# Patient Record
Sex: MALE | Race: WHITE | Employment: OTHER | URBAN - METROPOLITAN AREA
[De-identification: names, ages, dates, MRNs, and addresses within clinical notes are randomized per-mention and may not be internally consistent; named-entity substitution may affect disease eponyms.]

---

## 2018-04-26 ENCOUNTER — HOSPITAL ENCOUNTER (OUTPATIENT)
Dept: WOUND CARE | Age: 78
Discharge: HOME | End: 2018-04-26
Payer: MEDICARE

## 2018-04-26 VITALS
RESPIRATION RATE: 18 BRPM | DIASTOLIC BLOOD PRESSURE: 85 MMHG | WEIGHT: 270 LBS | HEIGHT: 72 IN | SYSTOLIC BLOOD PRESSURE: 133 MMHG | BODY MASS INDEX: 36.57 KG/M2 | TEMPERATURE: 98.6 F | HEART RATE: 90 BPM

## 2018-04-26 PROCEDURE — 99204 OFFICE O/P NEW MOD 45 MIN: CPT

## 2018-04-26 RX ORDER — FLUOXETINE 10 MG/1
10 CAPSULE ORAL DAILY
COMMUNITY

## 2018-04-26 RX ORDER — LISINOPRIL 20 MG/1
20 TABLET ORAL 2 TIMES DAILY
COMMUNITY

## 2018-04-26 RX ORDER — ATORVASTATIN CALCIUM 20 MG/1
20 TABLET, FILM COATED ORAL DAILY
COMMUNITY

## 2018-04-26 RX ORDER — METOLAZONE 5 MG/1
TABLET ORAL AS NEEDED
COMMUNITY

## 2018-04-26 RX ORDER — CARVEDILOL 12.5 MG/1
12.5 TABLET ORAL 2 TIMES DAILY WITH MEALS
COMMUNITY

## 2018-04-26 RX ORDER — FUROSEMIDE 40 MG/1
40 TABLET ORAL DAILY
COMMUNITY

## 2018-04-26 RX ORDER — TAMSULOSIN HYDROCHLORIDE 0.4 MG/1
0.4 CAPSULE ORAL DAILY
COMMUNITY

## 2018-04-26 RX ORDER — MAGNESIUM CHLORIDE 70 MG
64 TABLET, DELAYED RELEASE (ENTERIC COATED) ORAL 2 TIMES DAILY
COMMUNITY

## 2018-04-26 NOTE — PROGRESS NOTES
Wound Center  Initial Consult Note          Chief Complaint:  Mckenzie Wang is a 65 y.o.  male who presents with lower leg swelling/ wounds of few weeks duration. Referred by:      HPI:   H/o leg swelling; unclear how wounds started; heavy drainage  On diurectic  Difficulty in elevating leg  Wound caused by: edema  Current wound care:none  Offloading wound:no  Appetite: good  Wound associated pain: 0  Diabetic: yes  Smoker: no  ROS: no N/V, no T/chills; no local rash, no known CHF, does get SOB  Past Medical History:   Diagnosis Date    Arrhythmia     A-Fib    Diabetes ([facility] [address]     Type 2    Hypertension     Morbid obesity ([facility] [address]       Past Surgical History:   Procedure Laterality Date    ABDOMEN SURGERY PROC UNLISTED      gallbalddar removal    HX APPENDECTOMY      HX CHOLECYSTECTOMY      HX TONSILLECTOMY      HX UROLOGICAL      vasectomy     Family History   Problem Relation Age of Onset    Cancer Brother       Social History   Substance Use Topics    Smoking status: Former Smoker    Smokeless tobacco: Never Used    Alcohol use Yes      Comment: 3 drinks per week       Prior to Admission medications    Medication Sig Start Date End Date Taking? Authorizing Provider   atorvastatin (LIPITOR) 20 mg tablet Take 20 mg by mouth daily. Yes Historical Provider   FLUoxetine (PROZAC) 10 mg capsule Take 10 mg by mouth daily. Yes Historical Provider   furosemide (LASIX) 40 mg tablet Take 40 mg by mouth daily. Yes Historical Provider   SITagliptin (JANUVIA) 100 mg tablet Take 100 mg by mouth daily. Yes Historical Provider   lisinopril (PRINIVIL, ZESTRIL) 20 mg tablet Take 20 mg by mouth two (2) times a day. Yes Historical Provider   magnesium chloride (MAG DELAY) 64 mg delayed release tablet Take 64 mg by mouth two (2) times a day.    Yes Historical Provider   insulin NPH/insulin regular (NOVOLIN 70/30 U-100 INSULIN) 100 unit/mL (70-30) injection 100 Units by SubCUTAneous route Daily (before breakfast). Yes Historical Provider   tamsulosin (FLOMAX) 0.4 mg capsule Take 0.4 mg by mouth daily. Yes Historical Provider   carvedilol (COREG) 12.5 mg tablet Take 12.5 mg by mouth two (2) times daily (with meals). Yes Historical Provider   metOLazone (ZAROXOLYN) 5 mg tablet Take  by mouth as needed (until weight is down 5 lbs then stop). Yes Historical Provider   rivaroxaban (XARELTO) 20 mg tab tablet Take 20 mg by mouth daily. Yes Historical Provider   multivitamin, tx-iron-ca-min (THERA-M W/ IRON) 9 mg iron-400 mcg tab tablet Take 1 Tab by mouth daily. Yes Historical Provider     No Known Allergies     Review of Systems:  A comprehensive review of systems was negative except for that written in the History of Present Illness. Also refer to [facility] notes. HgbA1c:  Advance Care plan:   Pneumonia vaccine:  BMI:see nursing documentation  Counseling re nutrition done. Current meds documented in chart  Pain: 0  Elder maltreatment screen: neg; documented in [facility] notes  Smoking: no  Blood pressure: noted below or in [facility]  Objective:     Physical Exam:     VS see nursing notes  General: well developed, well nourished, pleasant , NAD. Hygiene good, obese  Psych: cooperative. Pleasant. No anxiety or depression. Normal mood and affect. Neuro: alert and oriented to person/place/situation. Otherwise nonfocal.  Derm: Normal  turgor for age, dry skin  HEENT: Normocephalic, atraumatic. EOMI. Conjunctiva clear. No scleral icterus. Neck: Normal range of motion. No masses. Chest: Good air entry bilaterally. Respirations nonlabored  Cardio[de-identified] Normal heart sounds,no rubs, murmurs or gallops  Abdomen: Soft, nontender, nondistended, normoactive bowel sounds  Lower extremities: color normal; temperature normal. Hair growth is not present. Calves are supple, nontender, approximately equally sized in comparison.  Capillary refill <3 sec  Focussed Lower Extremity Exam:  Vascular exam:  Right lower extremity: moderate edema, foot warm,   DP pulse : cannot detect  PT pulse: cannot detect  Nails dystrophic   Left lower extremity: moderate  edema, foot warm,   DP pulse : cannot detect  PT pulse: cannot detect  Nails dystrophic    Ulcer Description:   Etiology: swelling  Location: R lower leg anterior  Measurement: [id number] cm  Ulcer bed: clustered, some slough, partial/full  Thickness mix  Periwound: edema, maceration  Exudate: Moderate amount Serous exudate    Data Review:   No results found for this or any previous visit (from the past 24 hour(s)). Assessment:     65 y.o. male with   1-R lower leg anterior chronic ulcers. 2- leg edema, chronic swelling, R/o venous insuff- order venous reflux    3- Poor vascular exam - order arterial studies    4- DM2 - A1c? - patient to get us records    Needs :  Serial debridement as needed  Good local wound care  Edema management  Nutrition optimization  Good Diabetic control  Plan:   205  scorbion sache  Tubi x 2   order  Supplies      Dressing: scorbion sachet   Frequency: three times a week    Remove dressing prior to showering  Do not get dressing wet    Edema management:   Elevate leg(s) throughout the day starting in the morning  Compression: Tubigrip x 2 layer  Avoid prolonged standing    Nutrition / high BMI:  Cut down startches and carbohydrates, Increase protein and vegetables    Pain management: monitor    Patient understood and agrees with plan. Questions answered. Explained how edema and local wound care works  >37 min spent with patient and spouse (>50% direct counseling)    Weekly visits and serial debridements also discussed.   Follow up with me in 1 week    Signed By: Andrew Rodriguez MD     April 26, 2018

## 2018-04-26 NOTE — WOUND CARE
04/26/18 1326   Wound Leg Lower Right; Anterior   Date First Assessed/Time First Assessed: 04/26/18 1325   POA: Yes  Wound Type: (c) Other  Location: Leg Lower  Orientation: Right; Anterior   Non-Pressure Injury Partial thickness (epider/derm)   Wound Length (cm) 15 cm   Wound Width (cm) 17 cm   Wound Depth (cm) 0.1   Wound Surface area (cm^2) 255 cm^2   Condition of Base Slough   Drainage Amount  Moderate   Drainage Color Serous; Yellow   Periwound Skin Condition Macerated

## 2018-04-27 ENCOUNTER — HOSPITAL ENCOUNTER (OUTPATIENT)
Dept: WOUND CARE | Age: 78
Discharge: HOME | End: 2018-04-27
Payer: MEDICARE

## 2018-05-03 ENCOUNTER — HOSPITAL ENCOUNTER (OUTPATIENT)
Dept: LAB | Age: 78
Discharge: HOME | End: 2018-05-03
Payer: MEDICARE

## 2018-05-03 ENCOUNTER — HOSPITAL ENCOUNTER (OUTPATIENT)
Dept: WOUND CARE | Age: 78
Discharge: HOME | End: 2018-05-03
Payer: MEDICARE

## 2018-05-03 VITALS
SYSTOLIC BLOOD PRESSURE: 122 MMHG | HEART RATE: 56 BPM | TEMPERATURE: 97.8 F | RESPIRATION RATE: 16 BRPM | DIASTOLIC BLOOD PRESSURE: 78 MMHG

## 2018-05-03 PROCEDURE — 97598 DBRDMT OPN WND ADDL 20CM/<: CPT

## 2018-05-03 PROCEDURE — 87070 CULTURE OTHR SPECIMN AEROBIC: CPT

## 2018-05-03 PROCEDURE — 74011000250 HC RX REV CODE- 250: Performed by: FAMILY MEDICINE

## 2018-05-03 PROCEDURE — 87077 CULTURE AEROBIC IDENTIFY: CPT

## 2018-05-03 PROCEDURE — 97597 DBRDMT OPN WND 1ST 20 CM/<: CPT

## 2018-05-03 RX ORDER — LIDOCAINE 40 MG/G
CREAM TOPICAL
Status: COMPLETED | OUTPATIENT
Start: 2018-05-03 | End: 2018-05-03

## 2018-05-03 RX ADMIN — LIDOCAINE: 4 CREAM TOPICAL at 13:01

## 2018-05-03 NOTE — WOUND CARE
05/03/18 1247   Wound Leg Lower Right; Anterior   Date First Assessed/Time First Assessed: 04/26/18 1325   POA: Yes  Wound Type: (c) Other  Location: Leg Lower  Orientation: Right; Anterior   DRESSING STATUS Saturated   DRESSING TYPE Dry dressing   Wound Length (cm) 15 cm   Wound Width (cm) 12 cm   Wound Depth (cm) 0.1   Wound Surface area (cm^2) 180 cm^2   Condition of Base Pink;Slough   Drainage Amount  Large   Drainage Color Yellow;Serous   Wound Odor Musty   Cleansing and Cleansing Agents  Normal saline

## 2018-05-03 NOTE — PROGRESS NOTES
Wound Center  Progress Note / Procedure Note    Subjective:   Dipti Hamilton is a 65 y.o.  male for follow up of R lower leg anterior  venous stasis ulcer since few months duration. Referred by Dr Mai Uribe did not stay on last visit- came for NV the next day. Also scorbion sorbact causing burning    Tolerated tubigrip x 1 layer only    Offloading wound: trying  Appetite: good  Wound associated pain: none  Diabetic: yes  Smoker: no  ROS: no N/V, no T/chills; no local rash  There have been no changes in patient's medical history in the interim. Past Medical History:   Diagnosis Date    Arrhythmia     A-Fib    Diabetes ([facility] [address]     Type 2    Hypertension     Morbid obesity ([facility] [address]       Past Surgical History:   Procedure Laterality Date    ABDOMEN SURGERY PROC UNLISTED      gallbalddar removal    HX APPENDECTOMY      HX CHOLECYSTECTOMY      HX TONSILLECTOMY      HX UROLOGICAL      vasectomy     Family History   Problem Relation Age of Onset    Cancer Brother       Social History   Substance Use Topics    Smoking status: Former Smoker    Smokeless tobacco: Never Used    Alcohol use Yes      Comment: 3 drinks per week       Prior to Admission medications    Medication Sig Start Date End Date Taking? Authorizing Provider   atorvastatin (LIPITOR) 20 mg tablet Take 20 mg by mouth daily. Historical Provider   FLUoxetine (PROZAC) 10 mg capsule Take 10 mg by mouth daily. Historical Provider   furosemide (LASIX) 40 mg tablet Take 40 mg by mouth daily. Historical Provider   SITagliptin (JANUVIA) 100 mg tablet Take 100 mg by mouth daily. Historical Provider   lisinopril (PRINIVIL, ZESTRIL) 20 mg tablet Take 20 mg by mouth two (2) times a day. Historical Provider   magnesium chloride (MAG DELAY) 64 mg delayed release tablet Take 64 mg by mouth two (2) times a day.     Historical Provider   insulin NPH/insulin regular (NOVOLIN 70/30 U-100 INSULIN) 100 unit/mL (70-30) injection 100 Units by SubCUTAneous route Daily (before breakfast). Historical Provider   tamsulosin (FLOMAX) 0.4 mg capsule Take 0.4 mg by mouth daily. Historical Provider   carvedilol (COREG) 12.5 mg tablet Take 12.5 mg by mouth two (2) times daily (with meals). Historical Provider   metOLazone (ZAROXOLYN) 5 mg tablet Take  by mouth as needed (until weight is down 5 lbs then stop). Historical Provider   rivaroxaban (XARELTO) 20 mg tab tablet Take 20 mg by mouth daily. Historical Provider   multivitamin, tx-iron-ca-min (THERA-M W/ IRON) 9 mg iron-400 mcg tab tablet Take 1 Tab by mouth daily. Historical Provider     No Known Allergies     Objective:   Visit Vitals    /78    Pulse (!) 56    Temp 97.8 °F (36.6 °C)    Resp 16      General: well developed, well nourished, pleasant , NAD. Hygiene good, obese  Psych: cooperative. Pleasant. No anxiety or depression. Normal mood and affect. Neuro: alert and oriented to person/place/situation. Otherwise nonfocal.  Lower extremities: color chronic ruddiness; temperature normal. Hair growth is not present. Calves are supple, nontender, approximately equally sized in comparison. Capillary refill <3 sec  Focussed Lower Extremity Exam:  Vascular exam:  Left lower extremity: moderate  edema, foot warm,   Nails dystrophic     Right lower extremity: moderate  edema, foot warm,   DP pulse : 1+  PT pulse: cannot detect   Nails dystrophic    Ulcer Description:   Etiology: Venous / edema  Location: Right leg, mostly anterior, some small areas circumferentially  Measurement: 15 x 12 x 0.1 cm cm  Ulcer bed: partial thickness, mostly slough covered, clustered  Periwound: chronic redness, swelling  Exudate: Moderate amount Serous exudate    Assessment/Plan   65 y.o. male with R lower leg anterior venous stasis ulcer.   1- Ulcer - slough covered, requires debridement, see note below  Chronic wound- with heavy drainage - no previous culture- culture done    Dressing: mepilex transfer, exudry  Frequency: three times a week    2- DM2, A1c?    3- R/O PAD- arterial studies pending    4- Venous reflux pending    5- R/o infection- Culture done      Remove dressing prior to showering  Do not get dressing wet    Edema management:  Elevate leg(s) throughout the day starting in the morning  Compression: Tubigrip x 1 layer  Avoid prolonged standing  Discussed ordering segmental pumps and velcro compression wraps    Nutrition / high BMI:  Cut down startches and carbohydrates, Increase protein and vegetables    Pain management: 0      Patient/spouse understood and agrees with plan. Questions answered. Weekly visits and serial debridements also discussed. Follow up with me in 1 week  -      Ulcer assessment: Due to presence of slough within the wound bed, ulcer requires debridement. Procedure: Debridement:   The indication for debridement was reviewed with patient. Risks of procedure (bleeding, infection, pain) were discussed with patient and consent signed. Questions were answered    Selective debridement   Indication: to remove slough / devitalized tissue/ selectively  Consent in chart   Anesthesia: Topical 2% lidocaine jelly  Instrument: curette  Residual Necrosis: Present and scored   Bleeding: <1ml   Hemostasis: Pressure   Patient tolerated procedure well   Procedural Pain: 0  Post - procedural pain: 0    Post debridement measurements: 15 x 12 x 0.2 cm  Surface area debrided: 15 x 10 = 150 sq.  Cm      Signed By: Gareth Branham MD     May 3, 2018

## 2018-05-09 ENCOUNTER — TELEPHONE (OUTPATIENT)
Dept: WOUND CARE | Age: 78
End: 2018-05-09

## 2018-05-09 NOTE — TELEPHONE ENCOUNTER
Called patient to confirm appointment with Dr. Rhiannon Tillman on 05/10/18, no answer, left voicemail.

## 2018-05-10 ENCOUNTER — HOSPITAL ENCOUNTER (OUTPATIENT)
Dept: WOUND CARE | Age: 78
Discharge: HOME | End: 2018-05-10
Payer: MEDICARE

## 2018-05-10 VITALS
SYSTOLIC BLOOD PRESSURE: 152 MMHG | RESPIRATION RATE: 18 BRPM | DIASTOLIC BLOOD PRESSURE: 90 MMHG | HEART RATE: 76 BPM | TEMPERATURE: 97.8 F

## 2018-05-10 PROCEDURE — 11045 DBRDMT SUBQ TISS EACH ADDL: CPT

## 2018-05-10 PROCEDURE — 11042 DBRDMT SUBQ TIS 1ST 20SQCM/<: CPT

## 2018-05-10 PROCEDURE — 74011000250 HC RX REV CODE- 250: Performed by: FAMILY MEDICINE

## 2018-05-10 RX ORDER — LIDOCAINE 40 MG/G
CREAM TOPICAL
Status: COMPLETED | OUTPATIENT
Start: 2018-05-10 | End: 2018-05-10

## 2018-05-10 RX ADMIN — LIDOCAINE: 4 CREAM TOPICAL at 13:13

## 2018-05-10 NOTE — PROGRESS NOTES
Wound Center  Progress Note / Procedure Note    Subjective:   Cynthia Diamond is a 77 y.o.  male for follow up of R lower leg anterior  venous stasis ulcer since few months duration. Referred by Dr Gissel Chua burning in leg- changing dressing BID  Draining less and less now  Tolerating tubigrip x 1 layer only    Offloading wound: trying  Appetite: good  Wound associated pain: none  Diabetic: yes  Smoker: no  ROS: no N/V, no T/chills; no local rash  There have been no changes in patient's medical history in the interim. Past Medical History:   Diagnosis Date    Arrhythmia     A-Fib    Diabetes ([facility] [address]     Type 2    Hypertension     Morbid obesity ([facility] [address]       Past Surgical History:   Procedure Laterality Date    ABDOMEN SURGERY PROC UNLISTED      gallbalddar removal    HX APPENDECTOMY      HX CHOLECYSTECTOMY      HX TONSILLECTOMY      HX UROLOGICAL      vasectomy     Family History   Problem Relation Age of Onset    Cancer Brother       Social History   Substance Use Topics    Smoking status: Former Smoker    Smokeless tobacco: Never Used    Alcohol use Yes      Comment: 3 drinks per week       Prior to Admission medications    Medication Sig Start Date End Date Taking? Authorizing Provider   atorvastatin (LIPITOR) 20 mg tablet Take 20 mg by mouth daily. Historical Provider   FLUoxetine (PROZAC) 10 mg capsule Take 10 mg by mouth daily. Historical Provider   furosemide (LASIX) 40 mg tablet Take 40 mg by mouth daily. Historical Provider   SITagliptin (JANUVIA) 100 mg tablet Take 100 mg by mouth daily. Historical Provider   lisinopril (PRINIVIL, ZESTRIL) 20 mg tablet Take 20 mg by mouth two (2) times a day. Historical Provider   magnesium chloride (MAG DELAY) 64 mg delayed release tablet Take 64 mg by mouth two (2) times a day.     Historical Provider   insulin NPH/insulin regular (NOVOLIN 70/30 U-100 INSULIN) 100 unit/mL (70-30) injection 100 Units by SubCUTAneous route Daily (before breakfast). Historical Provider   tamsulosin (FLOMAX) 0.4 mg capsule Take 0.4 mg by mouth daily. Historical Provider   carvedilol (COREG) 12.5 mg tablet Take 12.5 mg by mouth two (2) times daily (with meals). Historical Provider   metOLazone (ZAROXOLYN) 5 mg tablet Take  by mouth as needed (until weight is down 5 lbs then stop). Historical Provider   rivaroxaban (XARELTO) 20 mg tab tablet Take 20 mg by mouth daily. Historical Provider   multivitamin, tx-iron-ca-min (THERA-M W/ IRON) 9 mg iron-400 mcg tab tablet Take 1 Tab by mouth daily. Historical Provider     No Known Allergies     Objective:   Visit Vitals    /90    Pulse 76    Temp 97.8 °F (36.6 °C)    Resp 18       General: well developed, well nourished, pleasant , NAD. Hygiene good, obese  Psych: cooperative. Pleasant. No anxiety or depression. Normal mood and affect. Neuro: alert and oriented to person/place/situation. Otherwise nonfocal.  Lower extremities: color chronic ruddiness; temperature normal. Hair growth is not present. Calves are supple, nontender, approximately equally sized in comparison. Capillary refill <3 sec  Focussed Lower Extremity Exam:  Vascular exam:  Left lower extremity: moderate  edema, foot warm,   Nails dystrophic     Right lower extremity: moderate  edema, foot warm,   DP pulse : 1+  PT pulse: cannot detect   Nails dystrophic    Ulcer Description:   Etiology: Venous / edema  Location: Right leg, mostly anterior, some small areas circumferentially  Measurement: 13 x 13 x 0.1 cm   Ulcer bed: partial- full  thickness, mostly slough covered, clustered  Periwound: chronic redness, swelling  Exudate: small amount Serous exudate    Assessment/Plan   77 y.o. male with R lower leg anterior venous stasis ulcer.   1- Ulcer - improving, slough covered, requires debridement, see note below    Dressing: mepilex transfer, exudry  Cont same dressing, change every other day    2- DM2, A1c?    3- R/O PAD- arterial studies pending    4- Venous reflux pending    5- R/o infection- Culture Group B strep, heavy  Start Augmentin 875mg BID for 2 weeks    Remove dressing prior to showering  Do not get dressing wet    Edema management:  Elevate leg(s) throughout the day starting in the morning  Compression: Tubigrip x 1 layer  Avoid prolonged standing  Discussed ordering segmental pumps and velcro compression wraps    Nutrition / high BMI:  Cut down startches and carbohydrates, Increase protein and vegetables    Pain management: 0      Patient/spouse understood and agrees with plan. Questions answered. Weekly visits and serial debridements also discussed. Follow up with me in 1 week  -      Ulcer assessment: Due to presence of slough within the wound bed, ulcer requires debridement. Procedure: Debridement:   The indication for debridement was reviewed with patient. Risks of procedure (bleeding, infection, pain) were discussed with patient and consent signed. Questions were answered    Subcutaneous excisional debridement   Indication: to remove slough / vitalized & devitalized tissue/ through skin and Subcutaneous layer of wound  Consent in chart   Anesthesia: Topical 2% lidocaine jelly  Instrument: curette  Residual Necrosis: Present and scored   Bleeding: <1ml   Hemostasis: Pressure   Patient tolerated procedure well   Procedural Pain: 0  Post - procedural pain: 0    Post debridement measurements: 13 x 13 x 0.3 cm  Surface area debrided: 13 x 5 = 65 sq.  Cm      Signed By: Gareth Branham MD     May 10, 2018

## 2018-05-17 ENCOUNTER — HOSPITAL ENCOUNTER (OUTPATIENT)
Dept: WOUND CARE | Age: 78
Discharge: HOME | End: 2018-05-17
Payer: MEDICARE

## 2018-05-17 VITALS
RESPIRATION RATE: 18 BRPM | HEART RATE: 90 BPM | TEMPERATURE: 97.7 F | SYSTOLIC BLOOD PRESSURE: 139 MMHG | DIASTOLIC BLOOD PRESSURE: 82 MMHG

## 2018-05-17 PROCEDURE — 11042 DBRDMT SUBQ TIS 1ST 20SQCM/<: CPT

## 2018-05-17 NOTE — PROGRESS NOTES
Wound Center  Progress Note / Procedure Note    Subjective:   Tomeka Mchugh is a 77 y.o.  male for follow up of R lower leg anterior  venous stasis ulcer since few months duration. Referred by Dr Violet Haynes    Burning improved  Tolerating abx  Draining very little now  Tolerating tubigrip x 1 layer only    Picked up compression socks form pharmacy [facility] - have not tried yet    Also PCP increase diuretic from 40 lasix every day, alternating with 40 twice daily (total bid dose for 3 days /week)    Has not restarted bike yet    Overall happy with wound progress, changing every few days    Offloading wound: trying  Appetite: good  Wound associated pain: none  Diabetic: yes  Smoker: no  ROS: no N/V, no T/chills; no local rash  There have been no changes in patient's medical history in the interim. Past Medical History:   Diagnosis Date    Arrhythmia     A-Fib    Diabetes ([facility] [address]     Type 2    Hypertension     Morbid obesity ([facility] [address]       Past Surgical History:   Procedure Laterality Date    ABDOMEN SURGERY PROC UNLISTED      gallbalddar removal    HX APPENDECTOMY      HX CHOLECYSTECTOMY      HX TONSILLECTOMY      HX UROLOGICAL      vasectomy     Family History   Problem Relation Age of Onset    Cancer Brother       Social History   Substance Use Topics    Smoking status: Former Smoker    Smokeless tobacco: Never Used    Alcohol use Yes      Comment: 3 drinks per week       Current Outpatient Prescriptions on File Prior to Encounter   Medication Sig Dispense Refill    atorvastatin (LIPITOR) 20 mg tablet Take 20 mg by mouth daily.  FLUoxetine (PROZAC) 10 mg capsule Take 10 mg by mouth daily.  furosemide (LASIX) 40 mg tablet Take 40 mg by mouth daily.  SITagliptin (JANUVIA) 100 mg tablet Take 100 mg by mouth daily.  lisinopril (PRINIVIL, ZESTRIL) 20 mg tablet Take 20 mg by mouth two (2) times a day.       magnesium chloride (MAG DELAY) 64 mg delayed release tablet Take 64 mg by mouth two (2) times a day.  insulin NPH/insulin regular (NOVOLIN 70/30 U-100 INSULIN) 100 unit/mL (70-30) injection 100 Units by SubCUTAneous route Daily (before breakfast).  tamsulosin (FLOMAX) 0.4 mg capsule Take 0.4 mg by mouth daily.  carvedilol (COREG) 12.5 mg tablet Take 12.5 mg by mouth two (2) times daily (with meals).  metOLazone (ZAROXOLYN) 5 mg tablet Take  by mouth as needed (until weight is down 5 lbs then stop).  rivaroxaban (XARELTO) 20 mg tab tablet Take 20 mg by mouth daily.  multivitamin, tx-iron-ca-min (THERA-M W/ IRON) 9 mg iron-400 mcg tab tablet Take 1 Tab by mouth daily. No current facility-administered medications on file prior to encounter. No Known Allergies     Objective:   Visit Vitals    /82    Pulse 90    Temp 97.7 °F (36.5 °C)    Resp 18       General: well developed, well nourished, pleasant , NAD. Hygiene good, obese  Psych: cooperative. Pleasant. No anxiety or depression. Normal mood and affect. Neuro: alert and oriented to person/place/situation. Otherwise nonfocal.  Lower extremities: color chronic ruddiness R>>L; temperature normal. Hair growth is not present. Calves are supple, nontender, approximately equally sized in comparison. Capillary refill <3 sec  Focussed Lower Extremity Exam:  Vascular exam:  Left lower extremity: moderate pitting   edema, foot warm,   Nails dystrophic     Right lower extremity: moderate pitting  edema, foot warm,   DP pulse : 1+  PT pulse: cannot detect   Nails dystrophic    Ulcer Description:   Etiology: Venous / edema  Location: Right leg, mostly anterior, some small areas circumferentially  Measurement: 13 x 13 x 0.1 cm - clustered- only small 3 clusters visible  Ulcer bed: partial- full  thickness, mostly slough covered, clustered  Periwound: chronic redness, swelling  Exudate: small amount Serous exudate    Assessment/Plan   77 y.o. male with R lower leg anterior venous stasis ulcer.   1- Ulcer - improving++, slough covered, requires debridement, see note below  Drainage improved  Dressing: xeroform 3x/weel      2- DM2, A1c?    3- R/O PAD- normal PVR, discussed with patient    4- Venous reflux pending    5- Improving infection- Culture Group B strep, heavy  Finish Augmentin 875mg BID for 2 weeks    6- Acquired lymphedema  - discussed velcro compression- order  - discussed lymphedema pumps- patient to think about and let us knw next wee      Remove dressing prior to showering  Do not get dressing wet    Edema management:  Elevate leg(s) throughout the day starting in the morning  Compression: Tubigrip x 1 layer  Avoid prolonged standing      Nutrition / high BMI:  Cut down startches and carbohydrates, Increase protein and vegetables    Pain management: 0      Patient/spouse understood and agrees with plan. Questions answered. Weekly visits and serial debridements also discussed. Follow up with me in 1 week  -      Ulcer assessment: Due to presence of slough within the wound bed, ulcer requires debridement. Procedure: Debridement:   The indication for debridement was reviewed with patient. Risks of procedure (bleeding, infection, pain) were discussed with patient and consent signed. Questions were answered    Subcutaneous excisional debridement   Indication: to remove slough / vitalized & devitalized tissue/ through skin and Subcutaneous layer of wound  Consent in chart   Anesthesia: Topical 2% lidocaine jelly  Instrument: 15 blade  Residual Necrosis: Present and scored   Bleeding: <1ml   Hemostasis: Pressure   Patient tolerated procedure well   Procedural Pain: 0  Post - procedural pain: 0    Post debridement measurements: 13 x 13 x 0.3 cm  Surface area debrided: 1.5 x 0.5  = 0.75 sq.  Cm      Signed By: Dayanara Perez MD     May 17, 2018

## 2018-05-24 ENCOUNTER — HOSPITAL ENCOUNTER (OUTPATIENT)
Dept: WOUND CARE | Age: 78
Discharge: HOME | End: 2018-05-24
Payer: MEDICARE

## 2018-05-24 VITALS
SYSTOLIC BLOOD PRESSURE: 132 MMHG | DIASTOLIC BLOOD PRESSURE: 76 MMHG | TEMPERATURE: 98.7 F | RESPIRATION RATE: 16 BRPM | HEART RATE: 77 BPM

## 2018-05-24 PROCEDURE — 11042 DBRDMT SUBQ TIS 1ST 20SQCM/<: CPT

## 2018-05-24 PROCEDURE — 74011000250 HC RX REV CODE- 250: Performed by: FAMILY MEDICINE

## 2018-05-24 RX ORDER — LIDOCAINE 40 MG/G
CREAM TOPICAL
Status: COMPLETED | OUTPATIENT
Start: 2018-05-24 | End: 2018-05-24

## 2018-05-24 RX ADMIN — LIDOCAINE: 4 CREAM TOPICAL at 13:01

## 2018-05-24 NOTE — WOUND CARE
05/24/18 1302   Wound Leg Lower Right; Anterior   Date First Assessed/Time First Assessed: 04/26/18 1325   POA: Yes  Wound Type: (c) Other  Location: Leg Lower  Orientation: Right; Anterior   DRESSING STATUS Clean, dry, and intact   DRESSING TYPE Xeroform;Gauze;Gauze wrap (ish); Special tape (comment)   Non-Pressure Injury Partial thickness (epider/derm)   Wound Length (cm) 13 cm   Wound Width (cm) 13 cm   Wound Depth (cm) 0.1   Wound Surface area (cm^2) 169 cm^2   Change in Wound Size % 33.73   Drainage Amount  Scant   Drainage Color Serosanguinous   Wound Odor None   Cleansing and Cleansing Agents  Normal saline

## 2018-05-24 NOTE — WOUND CARE
05/24/18 1346   [REMOVED] Wound Leg Lower Right; Anterior   Final Assessment Date/Final Assessment Time: 05/24/18 1347  Date First Assessed/Time First Assessed: 04/26/18 1325   POA: Yes  Wound Type: (c) Other  Location: Leg Lower  Orientation: Right; Anterior   Wound Length (cm) 1.2 cm  (New Measurements per Dr. Riley Cueto )   Wound Width (cm) 0.6 cm   Wound Depth (cm) 0.1   Wound Surface area (cm^2) 0.72 cm^2   Condition of Base Epithelializing

## 2018-05-24 NOTE — PROGRESS NOTES
Wound Center  Progress Note / Procedure Note  Subjective:   Yoanna Yang is a 77 y.o.  male for follow up of R lower leg anterior  venous stasis ulcer since few months duration. Referred by Dr Leoncio Smith    Just small wound front of leg left  Pain, burning, drainage all improved    Offloading wound: trying  Appetite: good  Wound associated pain: none  Diabetic: yes  Smoker: no  ROS: no N/V, no T/chills; no local rash  There have been no changes in patient's medical history in the interim. Past Medical History:   Diagnosis Date    Arrhythmia     A-Fib    Diabetes ([facility] [address]     Type 2    Hypertension     Morbid obesity ([facility] [address]       Past Surgical History:   Procedure Laterality Date    ABDOMEN SURGERY PROC UNLISTED      gallbalddar removal    HX APPENDECTOMY      HX CHOLECYSTECTOMY      HX TONSILLECTOMY      HX UROLOGICAL      vasectomy     Family History   Problem Relation Age of Onset    Cancer Brother       Social History   Substance Use Topics    Smoking status: Former Smoker    Smokeless tobacco: Never Used    Alcohol use Yes      Comment: 3 drinks per week       Current Outpatient Prescriptions on File Prior to Encounter   Medication Sig Dispense Refill    atorvastatin (LIPITOR) 20 mg tablet Take 20 mg by mouth daily.  FLUoxetine (PROZAC) 10 mg capsule Take 10 mg by mouth daily.  furosemide (LASIX) 40 mg tablet Take 40 mg by mouth daily.  SITagliptin (JANUVIA) 100 mg tablet Take 100 mg by mouth daily.  lisinopril (PRINIVIL, ZESTRIL) 20 mg tablet Take 20 mg by mouth two (2) times a day.  magnesium chloride (MAG DELAY) 64 mg delayed release tablet Take 64 mg by mouth two (2) times a day.  insulin NPH/insulin regular (NOVOLIN 70/30 U-100 INSULIN) 100 unit/mL (70-30) injection 100 Units by SubCUTAneous route Daily (before breakfast).  tamsulosin (FLOMAX) 0.4 mg capsule Take 0.4 mg by mouth daily.       carvedilol (COREG) 12.5 mg tablet Take 12.5 mg by mouth two (2) times daily (with meals).  metOLazone (ZAROXOLYN) 5 mg tablet Take  by mouth as needed (until weight is down 5 lbs then stop).  rivaroxaban (XARELTO) 20 mg tab tablet Take 20 mg by mouth daily.  multivitamin, tx-iron-ca-min (THERA-M W/ IRON) 9 mg iron-400 mcg tab tablet Take 1 Tab by mouth daily. No current facility-administered medications on file prior to encounter. No Known Allergies     Objective:   Visit Vitals    /76    Pulse 77    Temp 98.7 °F (37.1 °C)    Resp 16     General: well developed, well nourished, pleasant , NAD. Hygiene good, obese  Psych: cooperative. Pleasant. No anxiety or depression. Normal mood and affect. Neuro: alert and oriented to person/place/situation. Otherwise nonfocal.  Lower extremities: color chronic ruddiness R>>L; temperature normal. Hair growth is not present. Calves are supple, nontender, approximately equally sized in comparison. Capillary refill <3 sec  Focussed Lower Extremity Exam:  Vascular exam:  Left lower extremity: moderate pitting   edema, foot warm,   Nails dystrophic     Right lower extremity: moderate pitting  edema, foot warm,   DP pulse : 1+  PT pulse: cannot detect   Nails dystrophic    Ulcer Description:   Etiology: Venous / edema  Location: Right leg, anterior  Measurement: 1.2 x 0.6 x 0.1 cm   Ulcer bed:  full  thickness, mix slough/granulation  Periwound: chronic redness, swelling  Exudate: small amount Serous exudate    Assessment/Plan   77 y.o. male with R lower leg anterior venous stasis ulcer.   1- Ulcer - improving++, slough covered, requires debridement, see note below  Drainage improved  Dressing: xeroform 3x/week      2- DM2, A1c?    3- R/O PAD- normal PVR, discussed with patient    4- Venous reflux pending    5- Improving infection- Culture Group B strep, heavy  Finish Augmentin 875mg BID for 2 weeks    6- Acquired lymphedema  - discussed velcro compression- has Mariola anglin now  - discussed lymphedema pumps- not interested      Remove dressing prior to showering  Do not get dressing wet    Edema management:  Elevate leg(s) throughout the day starting in the morning  Compression: Farrow wraps  Avoid prolonged standing  Exercise bike      Nutrition / high BMI:  Cut down startches and carbohydrates, Increase protein and vegetables    Pain management: 0      Patient/spouse understood and agrees with plan. Questions answered. Weekly visits and serial debridements also discussed. Follow up with me in 1 week  -      Ulcer assessment: Due to presence of slough within the wound bed, ulcer requires debridement. Procedure: Debridement:   The indication for debridement was reviewed with patient. Risks of procedure (bleeding, infection, pain) were discussed with patient and consent signed. Questions were answered    Subcutaneous excisional debridement   Indication: to remove slough / vitalized & devitalized tissue/ through skin and Subcutaneous layer of wound  Consent in chart   Anesthesia: Topical 2% lidocaine jelly  Instrument: 15 blade  Residual Necrosis: Present and scored   Bleeding: <1ml   Hemostasis: Pressure   Patient tolerated procedure well   Procedural Pain: 0  Post - procedural pain: 0    Post debridement measurements: 1.3 x 0.7 x 0.2 cm  Surface area debrided: <20 sq.  Cm      Signed By: Bruno Roberto MD     May 24, 2018

## 2018-05-30 ENCOUNTER — TELEPHONE (OUTPATIENT)
Dept: WOUND CARE | Age: 78
End: 2018-05-30

## 2018-05-31 ENCOUNTER — HOSPITAL ENCOUNTER (OUTPATIENT)
Dept: WOUND CARE | Age: 78
Discharge: HOME | End: 2018-05-31
Payer: MEDICARE

## 2018-05-31 VITALS
HEIGHT: 72 IN | HEART RATE: 89 BPM | SYSTOLIC BLOOD PRESSURE: 152 MMHG | BODY MASS INDEX: 36.57 KG/M2 | RESPIRATION RATE: 18 BRPM | WEIGHT: 270 LBS | DIASTOLIC BLOOD PRESSURE: 96 MMHG | TEMPERATURE: 98.8 F

## 2018-05-31 PROBLEM — E66.01 SEVERE OBESITY (BMI 35.0-39.9): Status: ACTIVE | Noted: 2018-05-31

## 2018-05-31 PROCEDURE — 11042 DBRDMT SUBQ TIS 1ST 20SQCM/<: CPT

## 2018-05-31 PROCEDURE — 74011000250 HC RX REV CODE- 250: Performed by: FAMILY MEDICINE

## 2018-05-31 RX ORDER — LIDOCAINE HYDROCHLORIDE 20 MG/ML
JELLY TOPICAL AS NEEDED
Status: DISCONTINUED | OUTPATIENT
Start: 2018-05-31 | End: 2018-06-04 | Stop reason: HOSPADM

## 2018-05-31 RX ADMIN — LIDOCAINE HYDROCHLORIDE: 20 JELLY TOPICAL at 13:11

## 2018-05-31 NOTE — PROGRESS NOTES
Wound Center  Progress Note / Procedure Note  Subjective:   Dmitri Walker is a 77 y.o.  male for follow up of R lower leg anterior  venous stasis ulcer since few months duration. Referred by Dr Benjamin Denis    Doing well  Wound looking good  Did not like [place] wraps- only tried once    Offloading wound: trying  Appetite: good  Wound associated pain: none  Diabetic: yes  Smoker: no  ROS: no N/V, no T/chills; no local rash  There have been no changes in patient's medical history in the interim. Past Medical History:   Diagnosis Date    Arrhythmia     A-Fib    Diabetes ([facility] [address]     Type 2    Hypertension     Morbid obesity ([facility] [address]       Past Surgical History:   Procedure Laterality Date    ABDOMEN SURGERY PROC UNLISTED      gallbalddar removal    HX APPENDECTOMY      HX CHOLECYSTECTOMY      HX TONSILLECTOMY      HX UROLOGICAL      vasectomy     Family History   Problem Relation Age of Onset    Cancer Brother       Social History   Substance Use Topics    Smoking status: Former Smoker    Smokeless tobacco: Never Used    Alcohol use Yes      Comment: 3 drinks per week       Current Outpatient Prescriptions on File Prior to Encounter   Medication Sig Dispense Refill    atorvastatin (LIPITOR) 20 mg tablet Take 20 mg by mouth daily.  FLUoxetine (PROZAC) 10 mg capsule Take 10 mg by mouth daily.  furosemide (LASIX) 40 mg tablet Take 40 mg by mouth daily.  SITagliptin (JANUVIA) 100 mg tablet Take 100 mg by mouth daily.  lisinopril (PRINIVIL, ZESTRIL) 20 mg tablet Take 20 mg by mouth two (2) times a day.  magnesium chloride (MAG DELAY) 64 mg delayed release tablet Take 64 mg by mouth two (2) times a day.  insulin NPH/insulin regular (NOVOLIN 70/30 U-100 INSULIN) 100 unit/mL (70-30) injection 100 Units by SubCUTAneous route Daily (before breakfast).  tamsulosin (FLOMAX) 0.4 mg capsule Take 0.4 mg by mouth daily.       carvedilol (COREG) 12.5 mg tablet Take 12.5 mg by mouth two (2) times daily (with meals).  metOLazone (ZAROXOLYN) 5 mg tablet Take  by mouth as needed (until weight is down 5 lbs then stop).  rivaroxaban (XARELTO) 20 mg tab tablet Take 20 mg by mouth daily.  multivitamin, tx-iron-ca-min (THERA-M W/ IRON) 9 mg iron-400 mcg tab tablet Take 1 Tab by mouth daily. No current facility-administered medications on file prior to encounter. No Known Allergies     Objective:   Visit Vitals    BP (!) 152/96    Pulse 89    Temp 98.8 °F (37.1 °C)    Resp 18    Ht 6' (1.829 m)    Wt 122.5 kg (270 lb)    BMI 36.62 kg/m2     General: well developed, well nourished, pleasant , NAD. Hygiene good, obese  Psych: cooperative. Pleasant. No anxiety or depression. Normal mood and affect. Neuro: alert and oriented to person/place/situation. Otherwise nonfocal.  Lower extremities: color chronic ruddiness R>>L; temperature normal. Hair growth is not present. Calves are supple, nontender, approximately equally sized in comparison. Capillary refill <3 sec  Focussed Lower Extremity Exam:  Vascular exam:  Left lower extremity: moderate pitting   edema, foot warm,   Nails dystrophic     Right lower extremity: moderate pitting  edema, foot warm,   DP pulse : 1+  PT pulse: cannot detect   Nails dystrophic    Ulcer Description:   Etiology: Venous / edema  Location: Right leg, anterior  Measurement: 0.5 x 0.3 x 0.1 cm   Ulcer bed:  full  thickness, mix slough/granulation  Periwound: chronic redness, swelling  Exudate: small amount Serous exudate    Assessment/Plan   77 y.o. male with R lower leg anterior venous stasis ulcer.   1- Ulcer - improving++, slough covered, requires debridement, see note below  Drainage improved  Dressing: xeroform 3x/week      2- DM2, A1c?    3- R/O PAD- normal PVR, discussed with patient    4- Venous reflux pending    5- Improving infection- Culture Group B strep, heavy  Finish Augmentin 875mg BID for 2 weeks    6- Acquired lymphedema  - discussed velcro compression- how to use them, how much compression they procide, compliace with wearing them daily, patient will try again. Also given Rx for compression socks 30-40mmHg   - discussed lymphedema pumps- not interested      Remove dressing prior to showering  Do not get dressing wet    Edema management:  Elevate leg(s) throughout the day starting in the morning  Compression: Farrow wraps  Avoid prolonged standing  Exercise bike      Nutrition / high BMI:  Cut down startches and carbohydrates, Increase protein and vegetables    Pain management: 0      Patient/spouse understood and agrees with plan. Questions answered. Weekly visits and serial debridements also discussed. Follow up with me in 1 week  -      Ulcer assessment: Due to presence of slough within the wound bed, ulcer requires debridement. Procedure: Debridement:   The indication for debridement was reviewed with patient. Risks of procedure (bleeding, infection, pain) were discussed with patient and consent signed. Questions were answered    Subcutaneous excisional debridement   Indication: to remove slough / vitalized & devitalized tissue/ through skin and Subcutaneous layer of wound  Consent in chart   Anesthesia: Topical 2% lidocaine jelly  Instrument: 15 blade  Residual Necrosis: Present and scored   Bleeding: <1ml   Hemostasis: Pressure   Patient tolerated procedure well   Procedural Pain: 0  Post - procedural pain: 0    Post debridement measurements: 0.5 x 0.3 x 0.3 cm  Surface area debrided: <20 sq.  Cm      Signed By: Quentin Tesfaye MD     May 31, 2018

## 2018-05-31 NOTE — WOUND CARE
05/31/18 1305   Wound Leg Lower Right; Anterior   Date First Assessed/Time First Assessed: 04/26/18 1325   POA: Yes  Wound Type: (c) Other  Location: Leg Lower  Orientation: Right; Anterior   DRESSING STATUS Clean, dry, and intact   Non-Pressure Injury Partial thickness (epider/derm)   Wound Length (cm) 0.5 cm   Wound Width (cm) 0.3 cm   Wound Depth (cm) 0.1   Wound Surface area (cm^2) 0.15 cm^2   Condition of Base Epithelializing   Drainage Amount  None   Wound Odor None   Cleansing and Cleansing Agents  Normal saline

## 2018-06-06 ENCOUNTER — TELEPHONE (OUTPATIENT)
Dept: WOUND CARE | Age: 78
End: 2018-06-06

## 2018-06-07 ENCOUNTER — HOSPITAL ENCOUNTER (OUTPATIENT)
Dept: WOUND CARE | Age: 78
Discharge: HOME | End: 2018-06-07
Payer: MEDICARE

## 2018-06-07 VITALS
HEART RATE: 66 BPM | SYSTOLIC BLOOD PRESSURE: 149 MMHG | TEMPERATURE: 97.5 F | DIASTOLIC BLOOD PRESSURE: 74 MMHG | RESPIRATION RATE: 16 BRPM

## 2018-06-07 PROCEDURE — 99212 OFFICE O/P EST SF 10 MIN: CPT

## 2018-06-07 NOTE — WOUND CARE
06/07/18 1338   Wound Leg Lower Right; Anterior   Date First Assessed/Time First Assessed: 04/26/18 1325   POA: Yes  Wound Type: (c) Other  Location: Leg Lower  Orientation: Right; Anterior   DRESSING STATUS Clean, dry, and intact   DRESSING TYPE Xeroform   Wound Length (cm) 0.3 cm   Wound Width (cm) 0.3 cm   Wound Depth (cm) 0.1   Wound Surface area (cm^2) 0.09 cm^2   Condition of Base Epithelializing;Pink   Drainage Amount  None   Wound Odor None   Cleansing and Cleansing Agents  Normal saline

## 2018-06-07 NOTE — PROGRESS NOTES
Wound Center  Progress Note  Subjective:   Ange Ramon is a 77 y.o.  male for follow up of R lower leg anterior  venous stasis ulcer since few months duration. Referred by Dr Edna Evans    Doing well  Wound looking good  No new concerns    Offloading wound: trying  Appetite: good  Wound associated pain: none  Diabetic: yes  Smoker: no  ROS: no N/V, no T/chills; no local rash  There have been no changes in patient's medical history in the interim. Past Medical History:   Diagnosis Date    Arrhythmia     A-Fib    Diabetes ([address]     Type 2    Hypertension     Morbid obesity ([facility] [address]       Past Surgical History:   Procedure Laterality Date    ABDOMEN SURGERY PROC UNLISTED      gallbalddar removal    HX APPENDECTOMY      HX CHOLECYSTECTOMY      HX TONSILLECTOMY      HX UROLOGICAL      vasectomy     Family History   Problem Relation Age of Onset    Cancer Brother       Social History   Substance Use Topics    Smoking status: Former Smoker    Smokeless tobacco: Never Used    Alcohol use Yes      Comment: 3 drinks per week       Current Outpatient Prescriptions on File Prior to Encounter   Medication Sig Dispense Refill    atorvastatin (LIPITOR) 20 mg tablet Take 20 mg by mouth daily.  FLUoxetine (PROZAC) 10 mg capsule Take 10 mg by mouth daily.  furosemide (LASIX) 40 mg tablet Take 40 mg by mouth daily.  SITagliptin (JANUVIA) 100 mg tablet Take 100 mg by mouth daily.  lisinopril (PRINIVIL, ZESTRIL) 20 mg tablet Take 20 mg by mouth two (2) times a day.  magnesium chloride (MAG DELAY) 64 mg delayed release tablet Take 64 mg by mouth two (2) times a day.  insulin NPH/insulin regular (NOVOLIN 70/30 U-100 INSULIN) 100 unit/mL (70-30) injection 100 Units by SubCUTAneous route Daily (before breakfast).  tamsulosin (FLOMAX) 0.4 mg capsule Take 0.4 mg by mouth daily.  carvedilol (COREG) 12.5 mg tablet Take 12.5 mg by mouth two (2) times daily (with meals).       metOLazone (ZAROXOLYN) 5 mg tablet Take  by mouth as needed (until weight is down 5 lbs then stop).  rivaroxaban (XARELTO) 20 mg tab tablet Take 20 mg by mouth daily.  multivitamin, tx-iron-ca-min (THERA-M W/ IRON) 9 mg iron-400 mcg tab tablet Take 1 Tab by mouth daily. No current facility-administered medications on file prior to encounter. No Known Allergies     Objective:   Visit Vitals    /74    Pulse 66    Temp 97.5 °F (36.4 °C)    Resp 16     General: well developed, well nourished, pleasant , NAD. Hygiene good, obese  Psych: cooperative. Pleasant. No anxiety or depression. Normal mood and affect. Neuro: alert and oriented to person/place/situation. Otherwise nonfocal.  Lower extremities: color chronic ruddiness R>>L; temperature normal. Hair growth is not present. Calves are supple, nontender, approximately equally sized in comparison. Capillary refill <3 sec  Focussed Lower Extremity Exam:  Vascular exam:  Left lower extremity: moderate pitting   edema, foot warm,   Nails dystrophic     Right lower extremity: moderate pitting  edema, foot warm,   DP pulse : 1+  PT pulse: cannot detect   Nails dystrophic    Ulcer Description:   Etiology: Venous / edema  Location: Right leg, anterior  Healed with indentation    Assessment/Plan   77 y.o. male with R lower leg anterior venous stasis ulcer.   1- Ulcer - healed, cont same dressing for another week  Dressing: xeroform 3x/week      2- DM2, A1c?    3- R/O PAD- normal PVR, discussed with patient    4- Venous reflux     5-  infection- treated  6- Acquired lymphedema      - prevention discussed:    Moisturize  Elevate leg  Compression: Farrow wraps  Avoid prolonged standing  Exercise bike  >16 min counseling (>50% direct time with patient)  Follow up prn    Signed By: Bruno Roberto MD     June 7, 2018

## 2018-06-07 NOTE — WOUND CARE
[address]      Your treatment has been completed at [facility] outpatient wound clinic on 6/7/2018  , per Dr. Jovita Fischer . We know patients have several options when choosing a health care provider. We would like to express our sincere appreciation for having had the chance to be yours. More importantly, we enjoy having you as part of our family. We also hope your experience with us has surpassed your expectations and that you have been pleased with our service. We appreciate the confidence you've shown in selecting us as your health care provider and we will continue or commitment to provide the highest quality of service. Again, thank you for choosing us for your health care needs. If you have any further questions or concerns, please call [phone]. It has been our pleasure serving you and we hope to continue our relationship in the future, if the need occurs.        Sincerely,    [address] [facility]

## 2018-06-07 NOTE — WOUND CARE
Patient was discharged with family to home and was ambulatory. Patient stable upon discharge, denies pain.